# Patient Record
Sex: FEMALE | Race: WHITE | NOT HISPANIC OR LATINO | Employment: OTHER | URBAN - METROPOLITAN AREA
[De-identification: names, ages, dates, MRNs, and addresses within clinical notes are randomized per-mention and may not be internally consistent; named-entity substitution may affect disease eponyms.]

---

## 2017-04-12 RX ORDER — AMLODIPINE BESYLATE 5 MG/1
TABLET ORAL
Qty: 30 TABLET | Refills: 5 | Status: SHIPPED | OUTPATIENT
Start: 2017-04-12 | End: 2017-10-13 | Stop reason: SDUPTHER

## 2017-04-12 RX ORDER — LOSARTAN POTASSIUM 50 MG/1
TABLET ORAL
Qty: 30 TABLET | Refills: 5 | Status: SHIPPED | OUTPATIENT
Start: 2017-04-12 | End: 2017-10-13 | Stop reason: SDUPTHER

## 2017-04-19 ENCOUNTER — PATIENT OUTREACH (OUTPATIENT)
Dept: ADMINISTRATIVE | Facility: HOSPITAL | Age: 82
End: 2017-04-19

## 2017-04-19 DIAGNOSIS — I10 ESSENTIAL HYPERTENSION: Primary | Chronic | ICD-10-CM

## 2017-05-03 ENCOUNTER — OFFICE VISIT (OUTPATIENT)
Dept: INTERNAL MEDICINE | Facility: CLINIC | Age: 82
End: 2017-05-03
Payer: MEDICARE

## 2017-05-03 ENCOUNTER — LAB VISIT (OUTPATIENT)
Dept: LAB | Facility: HOSPITAL | Age: 82
End: 2017-05-03
Attending: FAMILY MEDICINE
Payer: MEDICARE

## 2017-05-03 VITALS
DIASTOLIC BLOOD PRESSURE: 70 MMHG | HEART RATE: 80 BPM | OXYGEN SATURATION: 98 % | SYSTOLIC BLOOD PRESSURE: 120 MMHG | HEIGHT: 59 IN | TEMPERATURE: 97 F | BODY MASS INDEX: 19.03 KG/M2 | WEIGHT: 94.38 LBS

## 2017-05-03 DIAGNOSIS — I35.0 AORTIC VALVE STENOSIS, UNSPECIFIED ETIOLOGY: Chronic | ICD-10-CM

## 2017-05-03 DIAGNOSIS — Z00.00 ROUTINE GENERAL MEDICAL EXAMINATION AT A HEALTH CARE FACILITY: Primary | ICD-10-CM

## 2017-05-03 DIAGNOSIS — I10 ESSENTIAL HYPERTENSION: Chronic | ICD-10-CM

## 2017-05-03 DIAGNOSIS — M15.9 PRIMARY OSTEOARTHRITIS INVOLVING MULTIPLE JOINTS: Chronic | ICD-10-CM

## 2017-05-03 DIAGNOSIS — N18.3 CKD (CHRONIC KIDNEY DISEASE), STAGE 3 (MODERATE): ICD-10-CM

## 2017-05-03 PROBLEM — N18.30 STAGE 3 CHRONIC KIDNEY DISEASE: Status: ACTIVE | Noted: 2017-05-03

## 2017-05-03 LAB
ALBUMIN SERPL BCP-MCNC: 4 G/DL
ALP SERPL-CCNC: 75 U/L
ALT SERPL W/O P-5'-P-CCNC: 13 U/L
ANION GAP SERPL CALC-SCNC: 15 MMOL/L
AST SERPL-CCNC: 28 U/L
BASOPHILS # BLD AUTO: 0.03 K/UL
BASOPHILS NFR BLD: 0.5 %
BILIRUB SERPL-MCNC: 0.5 MG/DL
BUN SERPL-MCNC: 23 MG/DL
CALCIUM SERPL-MCNC: 10.1 MG/DL
CHLORIDE SERPL-SCNC: 104 MMOL/L
CHOLEST/HDLC SERPL: 2.3 {RATIO}
CO2 SERPL-SCNC: 21 MMOL/L
CREAT SERPL-MCNC: 1 MG/DL
DIFFERENTIAL METHOD: ABNORMAL
EOSINOPHIL # BLD AUTO: 0.2 K/UL
EOSINOPHIL NFR BLD: 4.2 %
ERYTHROCYTE [DISTWIDTH] IN BLOOD BY AUTOMATED COUNT: 13.9 %
EST. GFR  (AFRICAN AMERICAN): 55.3 ML/MIN/1.73 M^2
EST. GFR  (NON AFRICAN AMERICAN): 48 ML/MIN/1.73 M^2
GLUCOSE SERPL-MCNC: 89 MG/DL
HCT VFR BLD AUTO: 37.2 %
HDL/CHOLESTEROL RATIO: 42.6 %
HDLC SERPL-MCNC: 202 MG/DL
HDLC SERPL-MCNC: 86 MG/DL
HGB BLD-MCNC: 11.9 G/DL
LDLC SERPL CALC-MCNC: 101.2 MG/DL
LYMPHOCYTES # BLD AUTO: 1.9 K/UL
LYMPHOCYTES NFR BLD: 34.1 %
MCH RBC QN AUTO: 30.8 PG
MCHC RBC AUTO-ENTMCNC: 32 %
MCV RBC AUTO: 96 FL
MONOCYTES # BLD AUTO: 0.5 K/UL
MONOCYTES NFR BLD: 9.8 %
NEUTROPHILS # BLD AUTO: 2.8 K/UL
NEUTROPHILS NFR BLD: 51.2 %
NONHDLC SERPL-MCNC: 116 MG/DL
PLATELET # BLD AUTO: 276 K/UL
PMV BLD AUTO: 11.1 FL
POTASSIUM SERPL-SCNC: 4.1 MMOL/L
PROT SERPL-MCNC: 7.3 G/DL
RBC # BLD AUTO: 3.86 M/UL
SODIUM SERPL-SCNC: 140 MMOL/L
T4 FREE SERPL-MCNC: 1.23 NG/DL
TRIGL SERPL-MCNC: 74 MG/DL
TSH SERPL DL<=0.005 MIU/L-ACNC: 4.25 UIU/ML
WBC # BLD AUTO: 5.51 K/UL

## 2017-05-03 PROCEDURE — 80053 COMPREHEN METABOLIC PANEL: CPT

## 2017-05-03 PROCEDURE — 85025 COMPLETE CBC W/AUTO DIFF WBC: CPT

## 2017-05-03 PROCEDURE — 36415 COLL VENOUS BLD VENIPUNCTURE: CPT | Mod: PO

## 2017-05-03 PROCEDURE — 99999 PR PBB SHADOW E&M-EST. PATIENT-LVL III: CPT | Mod: PBBFAC,,, | Performed by: FAMILY MEDICINE

## 2017-05-03 PROCEDURE — 99397 PER PM REEVAL EST PAT 65+ YR: CPT | Mod: S$GLB,,, | Performed by: FAMILY MEDICINE

## 2017-05-03 PROCEDURE — 99499 UNLISTED E&M SERVICE: CPT | Mod: ,,, | Performed by: FAMILY MEDICINE

## 2017-05-03 PROCEDURE — 84443 ASSAY THYROID STIM HORMONE: CPT

## 2017-05-03 PROCEDURE — 84439 ASSAY OF FREE THYROXINE: CPT

## 2017-05-03 PROCEDURE — 80061 LIPID PANEL: CPT

## 2017-05-03 PROCEDURE — 99499 UNLISTED E&M SERVICE: CPT | Mod: S$GLB,,, | Performed by: FAMILY MEDICINE

## 2017-05-03 NOTE — MR AVS SNAPSHOT
[facility]  [address] [place] [address]  Phone: [phone]  Fax: [phone]                  Wyatt Sung   5/3/2017 8:40 AM   Office Visit    Description:  Female : 1921   Provider:  Brittany Zheng MD   [facility]  [facility]           Reason for Visit     Annual Exam           Diagnoses this Visit        Comments    Routine general medical examination at a health care facility    -  Primary     Aortic valve stenosis, unspecified etiology         Essential hypertension         Primary osteoarthritis involving multiple joints                To Do List           Future Appointments        Provider Department Dept Phone    2017 9:00 AM Brittany Zheng MD [facility] [phone]      Goals (5 Years of Data)     None      Follow-Up and Disposition     Return in about 6 months (around 11/3/2017) for chronic issues.      [facility]     [facility] Nurse Care Line - 24/ Assistance  Unless otherwise directed by your provider, please contact [facility], our nurse care line that is available for  assistance.     Registered nurses in the [facility] Center provide: appointment scheduling, clinical advisement, health education, and other advisory services.  Call: [phone] (toll free)               Medications           Message regarding Medications     Verify the changes and/or additions to your medication regime listed below are the same as discussed with your clinician today.  If any of these changes or additions are incorrect, please notify your healthcare provider.             Verify that the below list of medications is an accurate representation of the medications you are currently taking.  If none reported, the list may be blank. If incorrect, please contact your healthcare provider. Carry this list with you in case of emergency.           Current Medications     amlodipine (NORVASC) 5 MG  "tablet TAKE ONE TABLET BY MOUTH EVERY DAY    losartan (COZAAR) 50 MG tablet TAKE ONE TABLET BY MOUTH EVERY DAY    LUMIGAN 0.01 % Drop            Clinical Reference Information           Your Vitals Were     BP Pulse Temp Height Weight SpO2    120/70 80 97 °F (36.1 °C) 4' 10.5" (1.486 m) 42.8 kg (94 lb 5.7 oz) 98%    BMI                19.38 kg/m2          Blood Pressure          Most Recent Value    BP  120/70      Allergies as of 5/3/2017     Aerobid [Flunisolide]    Ceclor [Cefaclor]    Mycinette [Cetylpyridinium-benzocaine]    Penicillins      Immunizations Administered on Date of Encounter - 5/3/2017     None      [facility] Sign-Up     Activating your [facility] account is as easy as 1-2-3!     1) Visit [email or web address], select Sign Up Now, enter this activation code and your date of birth, then select Next.  [email or web address]  Expires: 6/17/2017  9:05 AM      2) Create a username and password to use when you visit [facility] in the future and select a security question in case you lose your password and select Next.    3) Enter your e-mail address and click Sign Up!    Additional Information  If you have questions, please e-mail [email or web address] or call [phone] to talk to our [facility] staff. Remember, [facility] is NOT to be used for urgent needs. For medical emergencies, dial 911.         Language Assistance Services     ATTENTION: Language assistance services are available, free of charge. Please call [phone].      ATENCIÓN: Si habla español, tiene a rivera disposición servicios gratuitos de asistencia lingüística. Llame al [phone].     SHIRLEY Ý: N?u b?n nói Ti?ng Vi?t, có các d?ch v? h? tr? ngôn ng? mi?n phí dành cho b?n. G?i s? [phone].         [facility] complies with applicable Federal civil rights laws and does not discriminate on the basis of race, color, national origin, age, disability, or sex.        "

## 2017-05-03 NOTE — PROGRESS NOTES
Subjective:      Patient ID: Wyatt Sung is a 89 y.o. female.    Chief Complaint: Annual Exam    HPI Comments: Pt is a pleasant 89yo WF coming in today for followup of chronic issues and prevention exam.     She has a history of aortic stenosis which is controlled.  No shortness of breath with walking.  No chest pain.  No syncopal events.    HTN- is well-controlled with losartan and amlodipine.  No headaches or chest pain.  Willing  to do labs today.    She has chronic kidney disease for which is managed and maintained mainly because of her losartan and her age.  There is no swelling noted.    Arthritis- This is currently controlled just with occasional use of Tylenol.  She also uses glucosamine.          Lab Results   Component Value Date    WBC 5.14 03/02/2015    HGB 12.3 03/02/2015    HCT 38.1 03/02/2015     03/02/2015    CHOL 228 (H) 05/02/2016    TRIG 93 05/02/2016    HDL 90 (H) 05/02/2016     05/02/2016    K 3.9 05/02/2016     05/02/2016    CREATININE 1.1 05/02/2016    BUN 21 05/02/2016    CO2 21 (L) 05/02/2016    TSH 2.90 07/28/2009       Review of Systems   Constitutional: Negative for activity change, appetite change, fatigue and unexpected weight change.   Respiratory: Negative for cough and shortness of breath.    Cardiovascular: Negative for chest pain, palpitations and leg swelling.   Gastrointestinal: Negative for abdominal distention, abdominal pain, diarrhea, nausea and vomiting.   Musculoskeletal: Negative for arthralgias, gait problem and joint swelling.   Neurological: Negative for syncope, light-headedness and headaches.   Psychiatric/Behavioral: Negative for decreased concentration and dysphoric mood.     Objective:     Physical Exam   Constitutional: She is oriented to person, place, and time. She appears well-developed and well-nourished.   HENT:   Head: Normocephalic and atraumatic.   Right Ear: External ear normal.   Left Ear: External ear normal.   Mouth/Throat:  Oropharynx is clear and moist.   Eyes: EOM are normal.   Neck: Normal range of motion. Neck supple. Carotid bruit is not present. No thyroid mass and no thyromegaly present.   Cardiovascular: Normal rate and regular rhythm.  Exam reveals no gallop and no friction rub.    Murmur heard.   Systolic murmur is present with a grade of 3/6   Pulmonary/Chest: Effort normal. No respiratory distress. She has no wheezes. She has no rales.   Abdominal: Soft. Bowel sounds are normal. She exhibits no distension. There is no tenderness. There is no rebound.   Musculoskeletal: Normal range of motion. She exhibits no edema.   Lymphadenopathy:     She has no cervical adenopathy.   Neurological: She is alert and oriented to person, place, and time.   Skin: Skin is warm and dry. No rash noted.   Psychiatric: She has a normal mood and affect. Her behavior is normal. Judgment and thought content normal.   Vitals reviewed.    Assessment:     1. Routine general medical examination at a health care facility    2. Aortic valve stenosis, unspecified etiology    3. Essential hypertension    4. Primary osteoarthritis involving multiple joints    5. CKD (chronic kidney disease), stage 3 (moderate)      Plan:   Wyatt was seen today for annual exam.    Diagnoses and all orders for this visit:    Routine general medical examination at a health care facility- - labs ordered. Discussed Health Maintenance issues.       Aortic valve stenosis, unspecified etiology- - stable, Continue with current medications and interventions. Labs ordered      Essential hypertension - stable, Continue with current medications and interventions. Labs ordered    Primary osteoarthritis involving multiple joints - stable, Continue with current medications and interventions.    ckd- due to age. On losartan.           Return in about 6 months (around 11/3/2017) for chronic issues.

## 2017-05-05 NOTE — PROGRESS NOTES
Cholesterol, sugar levels, blood counts, and  kidney, liver functions, and thyroid functions within her age related goal ranges. Please inform

## 2017-10-13 RX ORDER — AMLODIPINE BESYLATE 5 MG/1
TABLET ORAL
Qty: 30 TABLET | Refills: 5 | Status: SHIPPED | OUTPATIENT
Start: 2017-10-13

## 2017-10-13 RX ORDER — LOSARTAN POTASSIUM 50 MG/1
TABLET ORAL
Qty: 30 TABLET | Refills: 5 | Status: SHIPPED | OUTPATIENT
Start: 2017-10-13

## 2017-11-01 ENCOUNTER — OFFICE VISIT (OUTPATIENT)
Dept: INTERNAL MEDICINE | Facility: CLINIC | Age: 82
End: 2017-11-01
Payer: MEDICARE

## 2017-11-01 VITALS
DIASTOLIC BLOOD PRESSURE: 60 MMHG | SYSTOLIC BLOOD PRESSURE: 139 MMHG | HEIGHT: 59 IN | TEMPERATURE: 98 F | BODY MASS INDEX: 18.71 KG/M2 | HEART RATE: 77 BPM | OXYGEN SATURATION: 98 % | RESPIRATION RATE: 12 BRPM | WEIGHT: 92.81 LBS

## 2017-11-01 DIAGNOSIS — I35.0 AORTIC VALVE STENOSIS, ETIOLOGY OF CARDIAC VALVE DISEASE UNSPECIFIED: Chronic | ICD-10-CM

## 2017-11-01 DIAGNOSIS — I10 ESSENTIAL HYPERTENSION: Chronic | ICD-10-CM

## 2017-11-01 DIAGNOSIS — M15.9 PRIMARY OSTEOARTHRITIS INVOLVING MULTIPLE JOINTS: Chronic | ICD-10-CM

## 2017-11-01 DIAGNOSIS — N18.30 STAGE 3 CHRONIC KIDNEY DISEASE: Primary | ICD-10-CM

## 2017-11-01 PROCEDURE — 99999 PR PBB SHADOW E&M-EST. PATIENT-LVL III: CPT | Mod: PBBFAC,,, | Performed by: FAMILY MEDICINE

## 2017-11-01 PROCEDURE — G0008 ADMIN INFLUENZA VIRUS VAC: HCPCS | Mod: S$GLB,,, | Performed by: FAMILY MEDICINE

## 2017-11-01 PROCEDURE — 99214 OFFICE O/P EST MOD 30 MIN: CPT | Mod: 25,S$GLB,, | Performed by: FAMILY MEDICINE

## 2017-11-01 PROCEDURE — 90662 IIV NO PRSV INCREASED AG IM: CPT | Mod: S$GLB,,, | Performed by: FAMILY MEDICINE

## 2017-11-01 PROCEDURE — 99499 UNLISTED E&M SERVICE: CPT | Mod: S$GLB,,, | Performed by: FAMILY MEDICINE

## 2017-11-01 RX ORDER — FLUOROMETHOLONE 1 MG/ML
1 SUSPENSION/ DROPS OPHTHALMIC DAILY PRN
COMMUNITY

## 2017-11-01 RX ORDER — CARBOXYMETHYLCELLULOSE SODIUM 10 MG/ML
1 GEL OPHTHALMIC 3 TIMES DAILY
COMMUNITY

## 2017-11-01 RX ORDER — ACETAMINOPHEN 500 MG
500 TABLET ORAL EVERY 6 HOURS PRN
Refills: 0
Start: 2017-11-01

## 2017-11-01 RX ORDER — GLUCOSAMINE/CHONDRO SU A 500-400 MG
2 TABLET ORAL DAILY
COMMUNITY

## 2017-11-01 NOTE — PROGRESS NOTES
Subjective:      Patient ID: Wyatt Sung is a 89 y.o. female.    Chief Complaint: Follow-up (six month)    Pt is a pleasant 89yo WF coming in today for followup of chronic issues      She has a history of aortic stenosis which is controlled.  No shortness of breath with walking.  No chest pain.  No syncopal events.BP controlled with 2 agents. Not doing statin due to age. Not doing asa due to CKD stage 3.     HTN- is well-controlled with losartan and amlodipine.  No headaches or chest pain.      She has chronic kidney disease for which is managed and maintained mainly because of her losartan and her age.  There is no swelling noted. No nsaids.     Arthritis- This is currently controlled just with occasional use of Tylenol.  She also uses glucosamine.            Lab Results   Component Value Date    WBC 5.51 05/03/2017    HGB 11.9 (L) 05/03/2017    HCT 37.2 05/03/2017     05/03/2017    CHOL 202 (H) 05/03/2017    TRIG 74 05/03/2017    HDL 86 (H) 05/03/2017    ALT 13 05/03/2017    AST 28 05/03/2017     05/03/2017    K 4.1 05/03/2017     05/03/2017    CREATININE 1.0 05/03/2017    BUN 23 05/03/2017    CO2 21 (L) 05/03/2017    TSH 4.245 (H) 05/03/2017       Review of Systems   Constitutional: Negative for activity change, appetite change, fatigue and unexpected weight change.   HENT: Positive for hearing loss. Negative for trouble swallowing and voice change.    Respiratory: Negative for cough and shortness of breath.    Cardiovascular: Negative for chest pain and palpitations.   Gastrointestinal: Negative for abdominal distention and abdominal pain.   Musculoskeletal: Positive for arthralgias.   Neurological: Negative for light-headedness and headaches.   Psychiatric/Behavioral: Negative for decreased concentration and dysphoric mood.     Objective:     Physical Exam   Constitutional: She is oriented to person, place, and time. She appears well-developed and well-nourished.   HENT:   Head:  Normocephalic and atraumatic.   Right Ear: External ear normal.   Left Ear: External ear normal.   Mouth/Throat: Oropharynx is clear and moist.   Eyes: EOM are normal.   Neck: Normal range of motion. Neck supple. Carotid bruit is not present. No thyroid mass and no thyromegaly present.   Cardiovascular: Normal rate and regular rhythm.  Exam reveals no gallop and no friction rub.    Murmur heard.   Systolic murmur is present with a grade of 3/6   Pulmonary/Chest: Effort normal. No respiratory distress. She has no wheezes. She has no rales.   Musculoskeletal: Normal range of motion. She exhibits no edema.   Lymphadenopathy:     She has no cervical adenopathy.   Neurological: She is alert and oriented to person, place, and time.   Psychiatric: She has a normal mood and affect. Her behavior is normal. Judgment and thought content normal.   Vitals reviewed.    Assessment:     1. Stage 3 chronic kidney disease    2. Primary osteoarthritis involving multiple joints    3. Essential hypertension    4. Aortic valve stenosis, etiology of cardiac valve disease unspecified      Plan:   Wyatt was seen today for follow-up.    Diagnoses and all orders for this visit:    Stage 3 chronic kidney disease - stable, Continue with current medications and interventions. Labs reviewed. Avoid nsaids.     -     Comprehensive metabolic panel; Future  -     CBC auto differential; Future    Primary osteoarthritis involving multiple joints- stable, Continue with current medications and interventions. Labs reviewed. Ok with tylenol.     Essential hypertension- stable, Continue with current medications and interventions. Labs reviewed.  -     Comprehensive metabolic panel; Future  -     CBC auto differential; Future    Aortic valve stenosis, etiology of cardiac valve disease unspecified- stable, Continue with current medications and interventions. Labs reviewed.  -     Comprehensive metabolic panel; Future  -     CBC auto differential;  Future    Other orders  -     acetaminophen (TYLENOL) 500 MG tablet; Take 1 tablet (500 mg total) by mouth every 6 (six) hours as needed for Pain (arthritis).          Flu shot today  Return in about 6 months (around 5/1/2018) for chronic issues Dr Zheng.

## 2019-01-07 ENCOUNTER — PATIENT OUTREACH (OUTPATIENT)
Dept: ADMINISTRATIVE | Facility: HOSPITAL | Age: 84
End: 2019-01-07

## 2020-09-21 ENCOUNTER — PES CALL (OUTPATIENT)
Dept: ADMINISTRATIVE | Facility: CLINIC | Age: 85
End: 2020-09-21